# Patient Record
Sex: MALE | Race: ASIAN | Employment: OTHER | URBAN - METROPOLITAN AREA
[De-identification: names, ages, dates, MRNs, and addresses within clinical notes are randomized per-mention and may not be internally consistent; named-entity substitution may affect disease eponyms.]

---

## 2019-01-24 ENCOUNTER — OFFICE VISIT (OUTPATIENT)
Dept: PRIMARY CARE CLINIC | Age: 44
End: 2019-01-24
Payer: COMMERCIAL

## 2019-01-24 VITALS
OXYGEN SATURATION: 96 % | SYSTOLIC BLOOD PRESSURE: 120 MMHG | TEMPERATURE: 97.8 F | DIASTOLIC BLOOD PRESSURE: 85 MMHG | HEIGHT: 63 IN | BODY MASS INDEX: 37.21 KG/M2 | HEART RATE: 81 BPM | WEIGHT: 210 LBS

## 2019-01-24 DIAGNOSIS — Z23 FLU VACCINE NEED: ICD-10-CM

## 2019-01-24 DIAGNOSIS — Z00.00 PHYSICAL EXAM: ICD-10-CM

## 2019-01-24 DIAGNOSIS — R63.8 WEIGHT DISORDER: ICD-10-CM

## 2019-01-24 PROCEDURE — G8482 FLU IMMUNIZE ORDER/ADMIN: HCPCS | Performed by: INTERNAL MEDICINE

## 2019-01-24 PROCEDURE — G8427 DOCREV CUR MEDS BY ELIG CLIN: HCPCS | Performed by: INTERNAL MEDICINE

## 2019-01-24 PROCEDURE — 90662 IIV NO PRSV INCREASED AG IM: CPT | Performed by: INTERNAL MEDICINE

## 2019-01-24 PROCEDURE — 1036F TOBACCO NON-USER: CPT | Performed by: INTERNAL MEDICINE

## 2019-01-24 PROCEDURE — 90471 IMMUNIZATION ADMIN: CPT | Performed by: INTERNAL MEDICINE

## 2019-01-24 PROCEDURE — G8417 CALC BMI ABV UP PARAM F/U: HCPCS | Performed by: INTERNAL MEDICINE

## 2019-01-24 PROCEDURE — 99204 OFFICE O/P NEW MOD 45 MIN: CPT | Performed by: INTERNAL MEDICINE

## 2019-01-24 RX ORDER — OMEPRAZOLE 20 MG/1
20 CAPSULE, DELAYED RELEASE ORAL DAILY
COMMUNITY
End: 2019-10-18 | Stop reason: ALTCHOICE

## 2019-01-24 ASSESSMENT — PATIENT HEALTH QUESTIONNAIRE - PHQ9
1. LITTLE INTEREST OR PLEASURE IN DOING THINGS: 0
SUM OF ALL RESPONSES TO PHQ QUESTIONS 1-9: 0
2. FEELING DOWN, DEPRESSED OR HOPELESS: 0
SUM OF ALL RESPONSES TO PHQ QUESTIONS 1-9: 0
SUM OF ALL RESPONSES TO PHQ9 QUESTIONS 1 & 2: 0

## 2019-01-24 ASSESSMENT — ENCOUNTER SYMPTOMS
GASTROINTESTINAL NEGATIVE: 1
BACK PAIN: 1
WHEEZING: 0
CONSTIPATION: 0
DIARRHEA: 0
COUGH: 0
CHEST TIGHTNESS: 0
BLOOD IN STOOL: 0
SHORTNESS OF BREATH: 0

## 2019-01-25 DIAGNOSIS — Z00.00 PHYSICAL EXAM: ICD-10-CM

## 2019-01-25 LAB
A/G RATIO: 1.5 (ref 1.1–2.2)
ALBUMIN SERPL-MCNC: 4.9 G/DL (ref 3.4–5)
ALP BLD-CCNC: 157 U/L (ref 40–129)
ALT SERPL-CCNC: 39 U/L (ref 10–40)
ANION GAP SERPL CALCULATED.3IONS-SCNC: 15 MMOL/L (ref 3–16)
AST SERPL-CCNC: 30 U/L (ref 15–37)
BASOPHILS ABSOLUTE: 0.1 K/UL (ref 0–0.2)
BASOPHILS RELATIVE PERCENT: 1 %
BILIRUB SERPL-MCNC: 0.6 MG/DL (ref 0–1)
BILIRUBIN URINE: NEGATIVE
BLOOD, URINE: NEGATIVE
BUN BLDV-MCNC: 7 MG/DL (ref 7–20)
CALCIUM SERPL-MCNC: 9.6 MG/DL (ref 8.3–10.6)
CHLORIDE BLD-SCNC: 100 MMOL/L (ref 99–110)
CHOLESTEROL, TOTAL: 180 MG/DL (ref 0–199)
CLARITY: CLEAR
CO2: 27 MMOL/L (ref 21–32)
COLOR: YELLOW
CREAT SERPL-MCNC: 0.8 MG/DL (ref 0.9–1.3)
EOSINOPHILS ABSOLUTE: 0.3 K/UL (ref 0–0.6)
EOSINOPHILS RELATIVE PERCENT: 3.6 %
GFR AFRICAN AMERICAN: >60
GFR NON-AFRICAN AMERICAN: >60
GLOBULIN: 3.3 G/DL
GLUCOSE BLD-MCNC: 103 MG/DL (ref 70–99)
GLUCOSE URINE: NEGATIVE MG/DL
HCT VFR BLD CALC: 48.3 % (ref 40.5–52.5)
HDLC SERPL-MCNC: 30 MG/DL (ref 40–60)
HEMOGLOBIN: 16.1 G/DL (ref 13.5–17.5)
HEPATITIS C ANTIBODY INTERPRETATION: NORMAL
KETONES, URINE: NEGATIVE MG/DL
LDL CHOLESTEROL CALCULATED: 102 MG/DL
LEUKOCYTE ESTERASE, URINE: NEGATIVE
LYMPHOCYTES ABSOLUTE: 1.9 K/UL (ref 1–5.1)
LYMPHOCYTES RELATIVE PERCENT: 26 %
MCH RBC QN AUTO: 29 PG (ref 26–34)
MCHC RBC AUTO-ENTMCNC: 33.4 G/DL (ref 31–36)
MCV RBC AUTO: 86.9 FL (ref 80–100)
MICROSCOPIC EXAMINATION: NORMAL
MONOCYTES ABSOLUTE: 0.6 K/UL (ref 0–1.3)
MONOCYTES RELATIVE PERCENT: 7.4 %
NEUTROPHILS ABSOLUTE: 4.6 K/UL (ref 1.7–7.7)
NEUTROPHILS RELATIVE PERCENT: 62 %
NITRITE, URINE: NEGATIVE
PDW BLD-RTO: 12.8 % (ref 12.4–15.4)
PH UA: 7.5
PLATELET # BLD: 403 K/UL (ref 135–450)
PMV BLD AUTO: 7.8 FL (ref 5–10.5)
POTASSIUM SERPL-SCNC: 4.9 MMOL/L (ref 3.5–5.1)
PROTEIN UA: NEGATIVE MG/DL
RBC # BLD: 5.56 M/UL (ref 4.2–5.9)
SODIUM BLD-SCNC: 142 MMOL/L (ref 136–145)
SPECIFIC GRAVITY UA: 1.02
TOTAL PROTEIN: 8.2 G/DL (ref 6.4–8.2)
TRIGL SERPL-MCNC: 240 MG/DL (ref 0–150)
TSH SERPL DL<=0.05 MIU/L-ACNC: 2.66 UIU/ML (ref 0.27–4.2)
URINE TYPE: NORMAL
UROBILINOGEN, URINE: 0.2 E.U./DL
VITAMIN D 25-HYDROXY: 19.5 NG/ML
VLDLC SERPL CALC-MCNC: 48 MG/DL
WBC # BLD: 7.5 K/UL (ref 4–11)

## 2019-01-26 LAB
ESTIMATED AVERAGE GLUCOSE: 119.8 MG/DL
HBA1C MFR BLD: 5.8 %
HIV AG/AB: NORMAL
HIV ANTIGEN: NORMAL
HIV-1 ANTIBODY: NORMAL
HIV-2 AB: NORMAL

## 2019-01-28 RX ORDER — ERGOCALCIFEROL 1.25 MG/1
50000 CAPSULE ORAL WEEKLY
Qty: 12 CAPSULE | Refills: 1 | Status: SHIPPED | OUTPATIENT
Start: 2019-01-28 | End: 2019-03-28

## 2019-01-30 LAB
PROSTATE SPECIFIC ANTIGEN FREE: 0.5 UG/L
PROSTATE SPECIFIC ANTIGEN PERCENT FREE: 10.6 %
PROSTATE SPECIFIC ANTIGEN: 4.7 UG/L (ref 0–4)

## 2019-02-04 ENCOUNTER — TELEPHONE (OUTPATIENT)
Dept: PRIMARY CARE CLINIC | Age: 44
End: 2019-02-04

## 2019-03-28 ENCOUNTER — OFFICE VISIT (OUTPATIENT)
Dept: PRIMARY CARE CLINIC | Age: 44
End: 2019-03-28
Payer: COMMERCIAL

## 2019-03-28 VITALS
BODY MASS INDEX: 37.03 KG/M2 | HEART RATE: 70 BPM | SYSTOLIC BLOOD PRESSURE: 130 MMHG | HEIGHT: 63 IN | DIASTOLIC BLOOD PRESSURE: 80 MMHG | WEIGHT: 209 LBS

## 2019-03-28 DIAGNOSIS — R97.20 ELEVATED PSA: ICD-10-CM

## 2019-03-28 DIAGNOSIS — E55.9 VITAMIN D DEFICIENCY: ICD-10-CM

## 2019-03-28 DIAGNOSIS — M54.50 CHRONIC MIDLINE LOW BACK PAIN WITHOUT SCIATICA: ICD-10-CM

## 2019-03-28 DIAGNOSIS — G89.29 CHRONIC MIDLINE LOW BACK PAIN WITHOUT SCIATICA: ICD-10-CM

## 2019-03-28 DIAGNOSIS — Z09 FOLLOW-UP EXAM: Primary | ICD-10-CM

## 2019-03-28 DIAGNOSIS — R63.8 WEIGHT DISORDER: ICD-10-CM

## 2019-03-28 PROCEDURE — 1036F TOBACCO NON-USER: CPT | Performed by: INTERNAL MEDICINE

## 2019-03-28 PROCEDURE — 99213 OFFICE O/P EST LOW 20 MIN: CPT | Performed by: INTERNAL MEDICINE

## 2019-03-28 PROCEDURE — G8417 CALC BMI ABV UP PARAM F/U: HCPCS | Performed by: INTERNAL MEDICINE

## 2019-03-28 PROCEDURE — G8482 FLU IMMUNIZE ORDER/ADMIN: HCPCS | Performed by: INTERNAL MEDICINE

## 2019-03-28 PROCEDURE — G8427 DOCREV CUR MEDS BY ELIG CLIN: HCPCS | Performed by: INTERNAL MEDICINE

## 2019-03-28 RX ORDER — ERGOCALCIFEROL 1.25 MG/1
50000 CAPSULE ORAL WEEKLY
Qty: 12 CAPSULE | Refills: 1 | Status: SHIPPED | OUTPATIENT
Start: 2019-03-28

## 2019-03-28 RX ORDER — ACETAMINOPHEN 500 MG
500 TABLET ORAL EVERY 6 HOURS PRN
Qty: 120 TABLET | Refills: 1 | Status: SHIPPED | OUTPATIENT
Start: 2019-03-28 | End: 2019-10-18 | Stop reason: SDUPTHER

## 2019-03-28 ASSESSMENT — ENCOUNTER SYMPTOMS
DIARRHEA: 0
BACK PAIN: 1
BLOOD IN STOOL: 0
COUGH: 0
CHEST TIGHTNESS: 0
GASTROINTESTINAL NEGATIVE: 1
CONSTIPATION: 0
SHORTNESS OF BREATH: 0
WHEEZING: 0

## 2019-09-25 ENCOUNTER — TELEPHONE (OUTPATIENT)
Dept: PRIMARY CARE CLINIC | Age: 44
End: 2019-09-25

## 2019-10-18 ENCOUNTER — OFFICE VISIT (OUTPATIENT)
Dept: PRIMARY CARE CLINIC | Age: 44
End: 2019-10-18
Payer: COMMERCIAL

## 2019-10-18 VITALS
BODY MASS INDEX: 37.21 KG/M2 | WEIGHT: 210 LBS | DIASTOLIC BLOOD PRESSURE: 80 MMHG | SYSTOLIC BLOOD PRESSURE: 130 MMHG | HEIGHT: 63 IN | HEART RATE: 66 BPM

## 2019-10-18 DIAGNOSIS — H53.9 VISION ABNORMALITIES: ICD-10-CM

## 2019-10-18 DIAGNOSIS — Z09 FOLLOW UP: Primary | ICD-10-CM

## 2019-10-18 DIAGNOSIS — K21.9 GASTROESOPHAGEAL REFLUX DISEASE WITHOUT ESOPHAGITIS: ICD-10-CM

## 2019-10-18 DIAGNOSIS — Z23 FLU VACCINE NEED: ICD-10-CM

## 2019-10-18 PROCEDURE — 99214 OFFICE O/P EST MOD 30 MIN: CPT | Performed by: INTERNAL MEDICINE

## 2019-10-18 PROCEDURE — 90471 IMMUNIZATION ADMIN: CPT | Performed by: INTERNAL MEDICINE

## 2019-10-18 PROCEDURE — G8482 FLU IMMUNIZE ORDER/ADMIN: HCPCS | Performed by: INTERNAL MEDICINE

## 2019-10-18 PROCEDURE — 1036F TOBACCO NON-USER: CPT | Performed by: INTERNAL MEDICINE

## 2019-10-18 PROCEDURE — 90686 IIV4 VACC NO PRSV 0.5 ML IM: CPT | Performed by: INTERNAL MEDICINE

## 2019-10-18 PROCEDURE — G8427 DOCREV CUR MEDS BY ELIG CLIN: HCPCS | Performed by: INTERNAL MEDICINE

## 2019-10-18 PROCEDURE — G8417 CALC BMI ABV UP PARAM F/U: HCPCS | Performed by: INTERNAL MEDICINE

## 2019-10-18 RX ORDER — ACETAMINOPHEN 500 MG
500 TABLET ORAL 3 TIMES DAILY PRN
Qty: 60 TABLET | Refills: 1 | Status: SHIPPED | OUTPATIENT
Start: 2019-10-18

## 2019-10-18 RX ORDER — OMEPRAZOLE 40 MG/1
40 CAPSULE, DELAYED RELEASE ORAL DAILY
Qty: 30 CAPSULE | Refills: 3 | Status: SHIPPED | OUTPATIENT
Start: 2019-10-18

## 2019-10-18 ASSESSMENT — ENCOUNTER SYMPTOMS
BLOOD IN STOOL: 0
COUGH: 0
DIARRHEA: 0
CONSTIPATION: 0
WHEEZING: 0
ABDOMINAL PAIN: 1
SHORTNESS OF BREATH: 0
CHEST TIGHTNESS: 0

## 2020-01-08 ENCOUNTER — TELEPHONE (OUTPATIENT)
Dept: PRIMARY CARE CLINIC | Age: 45
End: 2020-01-08

## 2020-01-30 ENCOUNTER — OFFICE VISIT (OUTPATIENT)
Dept: PRIMARY CARE CLINIC | Age: 45
End: 2020-01-30
Payer: COMMERCIAL

## 2020-01-30 VITALS
WEIGHT: 212 LBS | DIASTOLIC BLOOD PRESSURE: 82 MMHG | OXYGEN SATURATION: 99 % | HEIGHT: 63 IN | SYSTOLIC BLOOD PRESSURE: 137 MMHG | TEMPERATURE: 97.4 F | BODY MASS INDEX: 37.56 KG/M2 | HEART RATE: 67 BPM

## 2020-01-30 PROBLEM — H60.321 ACUTE HEMORRHAGIC OTITIS EXTERNA OF RIGHT EAR: Status: ACTIVE | Noted: 2020-01-30

## 2020-01-30 PROCEDURE — G8482 FLU IMMUNIZE ORDER/ADMIN: HCPCS | Performed by: INTERNAL MEDICINE

## 2020-01-30 PROCEDURE — 4130F TOPICAL PREP RX AOE: CPT | Performed by: INTERNAL MEDICINE

## 2020-01-30 PROCEDURE — 99213 OFFICE O/P EST LOW 20 MIN: CPT | Performed by: INTERNAL MEDICINE

## 2020-01-30 PROCEDURE — G8427 DOCREV CUR MEDS BY ELIG CLIN: HCPCS | Performed by: INTERNAL MEDICINE

## 2020-01-30 PROCEDURE — G8417 CALC BMI ABV UP PARAM F/U: HCPCS | Performed by: INTERNAL MEDICINE

## 2020-01-30 PROCEDURE — 1036F TOBACCO NON-USER: CPT | Performed by: INTERNAL MEDICINE

## 2020-01-30 RX ORDER — AMOXICILLIN AND CLAVULANATE POTASSIUM 875; 125 MG/1; MG/1
1 TABLET, FILM COATED ORAL 2 TIMES DAILY
Qty: 20 TABLET | Refills: 0 | Status: SHIPPED | OUTPATIENT
Start: 2020-01-30 | End: 2020-02-09

## 2020-01-30 RX ORDER — NEOMYCIN SULFATE, POLYMYXIN B SULFATE AND HYDROCORTISONE 10; 3.5; 1 MG/ML; MG/ML; [USP'U]/ML
3 SUSPENSION/ DROPS AURICULAR (OTIC) 4 TIMES DAILY
Qty: 1 BOTTLE | Refills: 0 | Status: SHIPPED | OUTPATIENT
Start: 2020-01-30 | End: 2020-02-09

## 2020-01-30 ASSESSMENT — ENCOUNTER SYMPTOMS
EYE REDNESS: 0
RESPIRATORY NEGATIVE: 1
PHOTOPHOBIA: 0
EYE ITCHING: 0
GASTROINTESTINAL NEGATIVE: 1
EYE DISCHARGE: 0
EYE PAIN: 0
EYES NEGATIVE: 1

## 2020-01-30 ASSESSMENT — PATIENT HEALTH QUESTIONNAIRE - PHQ9
SUM OF ALL RESPONSES TO PHQ9 QUESTIONS 1 & 2: 0
SUM OF ALL RESPONSES TO PHQ QUESTIONS 1-9: 0
1. LITTLE INTEREST OR PLEASURE IN DOING THINGS: 0
2. FEELING DOWN, DEPRESSED OR HOPELESS: 0
SUM OF ALL RESPONSES TO PHQ QUESTIONS 1-9: 0

## 2020-01-30 NOTE — PATIENT INSTRUCTIONS
until after the problem clears up. Ask your doctor if you should use earplugs to keep water out of your ears. · Do not swim until your doctor says you can. · If you get water in your ears, turn your head to each side and pull the earlobe in different directions. This will help the water run out. If your ears are still wet, use a hair dryer set on the lowest heat. Hold the dryer several inches from your ear. · Use your medicines exactly as prescribed. Call your doctor if you think you are having a problem with your medicine. Do not put drops in your ears unless your doctor prescribes them. When should you call for help? Watch closely for changes in your health, and be sure to contact your doctor if you have any problems. Where can you learn more? Go to [email or web address] [email or web address] Me [email or web address] org and sign in to your [employer] account. Enter Y951 in the [employer] box to learn more about \"Keeping Ears Dry: Care Instructions. \"     If you do not have an account, please click on the \"Sign Up Now\" link. Current as of: July 28, 2019  Content Version: 12.3  © 1503-4987 [employer]. Care instructions adapted under license by [facility] ([facility]). If you have questions about a medical condition or this instruction, always ask your healthcare professional. [address] any warranty or liability for your use of this information.

## 2020-01-30 NOTE — PROGRESS NOTES
Jani Gimenez  YOB: 1975    Date of Service:  1/30/2020    Chief Complaint   Patient presents with   Camelia Groves   The patient is [place] with a right ear infection. Using sign language it was clear to me that the patient had been using Q-tips or something else to clean his ear. See orders. Follow-up with his primary care physician in 3 months. Otalgia    There is pain in the right ear. This is a new problem. The current episode started 1 to 4 weeks ago. The problem occurs constantly. The problem has been unchanged. There has been no fever. The pain is at a severity of 4/10. The pain is moderate. Pertinent negatives include no rash. He has tried nothing for the symptoms. The treatment provided no relief. No Known Allergies  Outpatient Medications Marked as Taking for the 1/30/20 encounter (Office Visit) with Denise Gates MD   Medication Sig Dispense Refill    neomycin-polymyxin-hydrocortisone (CORTISPORIN) 3.5-10752-1 otic suspension Place 3 drops into the right ear 4 times daily for 10 days 1 Bottle 0    amoxicillin-clavulanate (AUGMENTIN) 875-125 MG per tablet Take 1 tablet by mouth 2 times daily for 10 days Take with meals. 20 tablet 0    acetaminophen (TYLENOL) 500 MG tablet Take 1 tablet by mouth 3 times daily as needed for Pain 60 tablet 1    omeprazole (PRILOSEC) 40 MG delayed release capsule Take 1 capsule by mouth daily 30 capsule 3    vitamin D (ERGOCALCIFEROL) 02070 units CAPS capsule Take 1 capsule by mouth once a week 12 capsule 1       Immunization History   Administered Date(s) Administered    Influenza, High Dose (Fluzone 65 yrs and older) 01/24/2019    Influenza, Quadv, IM, PF (6 mo and older Fluzone, Flulaval, Fluarix, and 3 yrs and older Afluria) 10/18/2019       No past medical history on file. No past surgical history on file. No family history on file.     Review of Systems:  Review of Systems   Constitutional: Negative for activity change, appetite and time. Psychiatric:         Behavior: Behavior normal.         Thought Content: Thought content normal.         Judgment: Judgment normal.         Lab Review   No visits with results within 6 Month(s) from this visit.    Latest known visit with results is:   Orders Only on 01/25/2019   Component Date Value    WBC 01/25/2019 7.5     RBC 01/25/2019 5.56     Hemoglobin 01/25/2019 16.1     Hematocrit 01/25/2019 48.3     MCV 01/25/2019 86.9     MCH 01/25/2019 29.0     MCHC 01/25/2019 33.4     RDW 01/25/2019 12.8     Platelets 76/20/6778 403     MPV 01/25/2019 7.8     Neutrophils % 01/25/2019 62.0     Lymphocytes % 01/25/2019 26.0     Monocytes % 01/25/2019 7.4     Eosinophils % 01/25/2019 3.6     Basophils % 01/25/2019 1.0     Neutrophils Absolute 01/25/2019 4.6     Lymphocytes Absolute 01/25/2019 1.9     Monocytes Absolute 01/25/2019 0.6     Eosinophils Absolute 01/25/2019 0.3     Basophils Absolute 01/25/2019 0.1     Sodium 01/25/2019 142     Potassium 01/25/2019 4.9     Chloride 01/25/2019 100     CO2 01/25/2019 27     Anion Gap 01/25/2019 15     Glucose 01/25/2019 103*    BUN 01/25/2019 7     CREATININE 01/25/2019 0.8*    GFR Non- 01/25/2019 >60     GFR  01/25/2019 >60     Calcium 01/25/2019 9.6     Total Protein 01/25/2019 8.2     Alb 01/25/2019 4.9     Albumin/Globulin Ratio 01/25/2019 1.5     Total Bilirubin 01/25/2019 0.6     Alkaline Phosphatase 01/25/2019 157*    ALT 01/25/2019 39     AST 01/25/2019 30     Globulin 01/25/2019 3.3     Hemoglobin A1C 01/25/2019 5.8     eAG 01/25/2019 119.8     Cholesterol, Total 01/25/2019 180     Triglycerides 01/25/2019 240*    HDL 01/25/2019 30*    LDL Calculated 01/25/2019 102*    VLDL Cholesterol Calcula* 01/25/2019 48     TSH 01/25/2019 2.66     Color, UA 01/25/2019 YELLOW     Clarity, UA 01/25/2019 Clear     Glucose, Ur 01/25/2019 Negative     Bilirubin Urine 01/25/2019 Negative     Ketones, Urine 01/25/2019 Negative     Specific Gravity, UA 01/25/2019 1.016     Blood, Urine 01/25/2019 Negative     pH, UA 01/25/2019 7.5     Protein, UA 01/25/2019 Negative     Urobilinogen, Urine 01/25/2019 0.2     Nitrite, Urine 01/25/2019 Negative     Leukocyte Esterase, Urine 01/25/2019 Negative     Microscopic Examination 01/25/2019 Not Indicated     Urine Type 01/25/2019 Not Specified     Vit D, 25-Hydroxy 01/25/2019 19.5*    PSA 01/25/2019 4.7*    PSA, Free 01/25/2019 0.5     PSA, Free Pct 01/25/2019 10.6     HIV Ag/Ab 01/25/2019 Non-Reactive     HIV-1 Antibody 01/25/2019 Non-Reactive     HIV ANTIGEN 01/25/2019 Non-Reactive     HIV-2 Ab 01/25/2019 Non-Reactive     Hep C Ab Interp 01/25/2019 Non-reactive      notapplicable      Health Maintenance   Topic Date Due    A1C test (Diabetic or Prediabetic)  01/25/2020    Lipid screen  01/25/2024    DTaP/Tdap/Td vaccine (2 - Td) 07/10/2028    Flu vaccine  Completed    HIV screen  Completed    Pneumococcal 0-64 years Vaccine  Aged Out          Assessment/Plan:    Acute hemorrhagic otitis externa of right ear  Painful right ear after using a Q-tip to clean it. The patient may have had a little bit of bleeding close to the eardrum. The whole canal is inflamed. Started on eardrops and Augmentin orally. Follow-up with ENT if this condition fails to improve. The patient should see his primary care physician for the follow-up visit and for referral if he does not improve. 1. Acute hemorrhagic otitis externa of right ear    - neomycin-polymyxin-hydrocortisone (CORTISPORIN) 3.5-35149-7 otic suspension; Place 3 drops into the right ear 4 times daily for 10 days  Dispense: 1 Bottle; Refill: 0  - amoxicillin-clavulanate (AUGMENTIN) 875-125 MG per tablet; Take 1 tablet by mouth 2 times daily for 10 days Take with meals. Dispense: 20 tablet; Refill: 0       No follow-ups on file. o